# Patient Record
Sex: FEMALE | Race: WHITE | Employment: FULL TIME | URBAN - METROPOLITAN AREA
[De-identification: names, ages, dates, MRNs, and addresses within clinical notes are randomized per-mention and may not be internally consistent; named-entity substitution may affect disease eponyms.]

---

## 2017-07-10 PROBLEM — G43.909 MIGRAINE WITHOUT STATUS MIGRAINOSUS, NOT INTRACTABLE: Status: ACTIVE | Noted: 2017-07-10

## 2018-06-12 PROBLEM — E66.01 SEVERE OBESITY (BMI 35.0-39.9): Status: ACTIVE | Noted: 2018-06-12

## 2018-12-06 ENCOUNTER — HOSPITAL ENCOUNTER (OUTPATIENT)
Dept: MAMMOGRAPHY | Age: 48
Discharge: HOME | End: 2018-12-06
Attending: FAMILY MEDICINE
Payer: COMMERCIAL

## 2018-12-06 DIAGNOSIS — Z12.31 SCREENING MAMMOGRAM, ENCOUNTER FOR: ICD-10-CM

## 2018-12-06 PROCEDURE — 77067 SCR MAMMO BI INCL CAD: CPT

## 2019-12-11 ENCOUNTER — HOSPITAL ENCOUNTER (OUTPATIENT)
Dept: DIABETES SERVICES | Age: 49
Discharge: HOME | End: 2019-12-11
Payer: COMMERCIAL

## 2019-12-11 PROCEDURE — G0108 DIAB MANAGE TRN  PER INDIV: HCPCS

## 2019-12-11 RX ORDER — TIZANIDINE HYDROCHLORIDE 4 MG/1
4 CAPSULE, GELATIN COATED ORAL 3 TIMES DAILY
COMMUNITY
End: 2021-04-12

## 2019-12-11 RX ORDER — SUMATRIPTAN 50 MG/1
50 TABLET, FILM COATED ORAL
COMMUNITY

## 2019-12-11 RX ORDER — GABAPENTIN 100 MG/1
100 CAPSULE ORAL
COMMUNITY

## 2020-01-09 ENCOUNTER — HOSPITAL ENCOUNTER (OUTPATIENT)
Dept: DIABETES SERVICES | Age: 50
Discharge: HOME | End: 2020-01-09
Payer: COMMERCIAL

## 2020-01-09 PROCEDURE — G0109 DIAB MANAGE TRN IND/GROUP: HCPCS

## 2020-01-09 NOTE — PROGRESS NOTES
Attended nutrition diabetes #1 group session today. Topics included: disease process and treatment; carbohydrate choices (emphasizing high fiber carbohydrates); proteins (emphasizing heart healthy choices) and fat food choices (emphasizing unsaturated fats); free foods; combination food choices; nutrition tips for persons with diabetes; snack ideas; resources for diabetes management. Two methods of meal planning were reviewed: carbohydrate choices and carbohydrate counting. Basics of exercise discussed. Voiced /demonstrated understanding of material covered. Anticipated adherence is good. Problems/barriers may be: none anticipated  Pt goals:  -lose weight   -stay off diabetes medications   Reviewed using small plates, cups and bowls to help with portion control and weight loss and role of exercise to help with weight loss and lowering blood sugars. No medication changes since last visit. No new procedure or surgery since last visit.

## 2020-01-16 ENCOUNTER — HOSPITAL ENCOUNTER (OUTPATIENT)
Dept: DIABETES SERVICES | Age: 50
Discharge: HOME | End: 2020-01-16
Payer: COMMERCIAL

## 2020-01-16 PROCEDURE — G0109 DIAB MANAGE TRN IND/GROUP: HCPCS

## 2020-01-16 NOTE — PROGRESS NOTES
Attended nutrition diabetes #2 group session today. Topics included: plate method for portion control; fiber and sodium guidelines; sugar substitutes; alcohol; eating out; recipe modification; label reading. Participant's nutrition goal: To lose weight she will replace Dr. Hirsch Husbands soda with water or Hiral zero calorie drink by 2/1/20. Participant's nutrition support plan: Use the meal planning guide, fooducate andrae, [place] andrae, [employer] website for recipes and call to sign up for the free program from the [employer] called Living with Type 2 diabetes. Nutrition barriers identified by participant: none  Voiced/demonstrated understanding of material covered. Anticipated adherence is good. No medication changes, procedures or surgeries since last visit. Plan for follow up is: will attend diabetes class.

## 2020-01-17 ENCOUNTER — HOSPITAL ENCOUNTER (OUTPATIENT)
Dept: DIABETES SERVICES | Age: 50
Discharge: HOME | End: 2020-01-17
Payer: COMMERCIAL

## 2020-01-17 PROCEDURE — G0109 DIAB MANAGE TRN IND/GROUP: HCPCS

## 2020-01-17 NOTE — PROGRESS NOTES
Participant attended Diabetes #1 session today. Topics included: Characteristics/pathophysiology type 1/type 2 diabetes; Goal/acceptable blood glucose ranges/Hgb A1C/interpreting/using results;meters, continuous glucose monitors and insulin pumps. Using medications safely; Sick day management; Prevention/detection/treatment of acute complications. - Verbalized understanding of material covered.  -Anticipated adherence is good   -Problems/barriers may be  none anticipated   - Limited mobility due to back issues. Would like to control diabetes without use of medication.

## 2020-01-22 ENCOUNTER — HOSPITAL ENCOUNTER (OUTPATIENT)
Dept: DIABETES SERVICES | Age: 50
Discharge: HOME | End: 2020-01-22
Payer: COMMERCIAL

## 2020-01-22 PROCEDURE — G0109 DIAB MANAGE TRN IND/GROUP: HCPCS

## 2020-01-22 NOTE — PROGRESS NOTES
Participant attended Diabetes #2 session today. Topics included: Prevention/detection/treatment of chronic complications; sleep apnea; Developing strategies to promote health/change behavior/recommended screenings; Developing strategies to address psychosocial issues; Goal setting. Participants goal/support plan includes:        Diabetes Goal:To stay off of medication, I will test blood sugars once a day. I will talk to physician about meter purchase at my appointment in February. I will begin testing after meter obtained. I will evaluate after two weeks and increase testing to twice a day and report any issues to physician. Diabetes Support  Plan: Follow Diabetes Education Material         Problems/barriers may be: Not having a glucose meter. Plan for follow up/Recommendations: mail follow up survey at 6 months and one year. Medication Reconciliation: No changes in medications, surgery or procedures. External referral . Will notify physician via fax of completion of Diabetes Education. Requested updated labs and or medications.

## 2021-04-12 ENCOUNTER — HOSPITAL ENCOUNTER (EMERGENCY)
Age: 51
Discharge: HOME | End: 2021-04-12
Attending: EMERGENCY MEDICINE
Payer: COMMERCIAL

## 2021-04-12 ENCOUNTER — APPOINTMENT (OUTPATIENT)
Dept: GENERAL RADIOLOGY | Age: 51
End: 2021-04-12
Attending: EMERGENCY MEDICINE
Payer: COMMERCIAL

## 2021-04-12 VITALS
HEART RATE: 86 BPM | OXYGEN SATURATION: 99 % | TEMPERATURE: 98 F | DIASTOLIC BLOOD PRESSURE: 68 MMHG | BODY MASS INDEX: 38.21 KG/M2 | HEIGHT: 69 IN | WEIGHT: 258 LBS | RESPIRATION RATE: 16 BRPM | SYSTOLIC BLOOD PRESSURE: 138 MMHG

## 2021-04-12 DIAGNOSIS — S80.01XA CONTUSION OF RIGHT KNEE, INITIAL ENCOUNTER: ICD-10-CM

## 2021-04-12 DIAGNOSIS — S80.02XA CONTUSION OF LEFT KNEE, INITIAL ENCOUNTER: ICD-10-CM

## 2021-04-12 DIAGNOSIS — S00.83XA CONTUSION OF FACE, INITIAL ENCOUNTER: Primary | ICD-10-CM

## 2021-04-12 PROCEDURE — 73562 X-RAY EXAM OF KNEE 3: CPT

## 2021-04-12 PROCEDURE — 99283 EMERGENCY DEPT VISIT LOW MDM: CPT

## 2021-04-12 PROCEDURE — 70150 X-RAY EXAM OF FACIAL BONES: CPT

## 2021-04-12 RX ORDER — METHOCARBAMOL 750 MG/1
750 TABLET, FILM COATED ORAL 4 TIMES DAILY
Qty: 30 TAB | Refills: 0 | Status: SHIPPED | OUTPATIENT
Start: 2021-04-12 | End: 2021-04-20

## 2021-04-12 RX ORDER — NAPROXEN 500 MG/1
500 TABLET ORAL 2 TIMES DAILY WITH MEALS
Qty: 20 TAB | Refills: 0 | Status: SHIPPED | OUTPATIENT
Start: 2021-04-12 | End: 2021-04-22

## 2021-04-13 NOTE — DISCHARGE INSTRUCTIONS
Ice to all sore areas, use meds as directed, see your primary care physician for routine recheck
“You can access the FollowHealth Patient Portal, offered by [facility], by registering with the following website: [email or web address]”

## 2021-04-13 NOTE — ED PROVIDER NOTES
Patient states she tripped over a short pet gait in her home landing on her knees and hitting her face, no loss of consciousness no lacerations no meds taken prior to arrival.    The history is provided by the patient. Fall  The accident occurred 1 to 2 hours ago. The fall occurred while walking. She fell from a height of ground level. She landed on hard floor. There was no blood loss. The point of impact was the right knee and left knee (Face). Pain location: As above. The pain is at a severity of 9/10. The pain is moderate. She was ambulatory at the scene. There was no entrapment after the fall. There was no drug use involved in the accident. There was no alcohol use involved in the accident. Pertinent negatives include no loss of consciousness and no laceration. The symptoms are aggravated by pressure on injury. She has tried nothing for the symptoms. The treatment provided no relief.         Past Medical History:   Diagnosis Date    Anxiety     Asthma     Chronic pain     neck/back MRI 2015 cervical spinal stenosis    Chronic sinusitis     GERD (gastroesophageal reflux disease)     prilosec prn    Hyperlipidemia     Infertility     Multinodular goiter 11/20/2015    Sleep apnea     no cpap    Type 2 diabetes mellitus (HCC)        Past Surgical History:   Procedure Laterality Date    HX ANKLE FRACTURE TX Right     w/plate    HX CHOLECYSTECTOMY      HX ORTHOPAEDIC      left thumb bone with defect and repaired    HX TONSILLECTOMY      and sinus surgery         Family History:   Problem Relation Age of Onset    Diabetes Mother     Thyroid Disease Mother         Status post hemithyroidectomy (benign), hypothyroidism    Breast Cancer Mother 36        3 times    Diabetes Father     Diabetes Maternal Aunt     Cancer Maternal Aunt     Breast Cancer Maternal Aunt 61    Heart Disease Paternal Aunt     Heart Disease Paternal Uncle     Diabetes Maternal Grandmother     Heart Disease Maternal Grandmother     Cancer Maternal Grandfather     Breast Cancer Maternal Grandfather     Heart Disease Paternal Grandfather     Cancer Maternal Aunt     Breast Cancer Maternal Aunt 61    Cancer Maternal Aunt     Heart Disease Paternal Uncle     Thyroid Disease Sister         Hypothyroidism    Thyroid Disease Cousin     Thyroid Cancer Neg Hx        Social History     Socioeconomic History    Marital status:      Spouse name: Not on file    Number of children: Not on file    Years of education: Not on file    Highest education level: Not on file   Occupational History    Not on file   Social Needs    Financial resource strain: Not on file    Food insecurity     Worry: Not on file     Inability: Not on file   [employer] needs     Medical: Not on file     Non-medical: Not on file   Tobacco Use    Smoking status: Former Smoker     Packs/day: 0.25     Years: 1.00     Pack years: 0.25    Smokeless tobacco: Never Used   Substance and Sexual Activity    Alcohol use: No    Drug use: Yes     Types: Prescription    Sexual activity: Not on file   Lifestyle    Physical activity     Days per week: Not on file     Minutes per session: Not on file    Stress: Not on file   Relationships    Social connections     Talks on phone: Not on file     Gets together: Not on file     Attends Muslim service: Not on file     Active member of club or organization: Not on file     Attends meetings of clubs or organizations: Not on file     Relationship status: Not on file    Intimate partner violence     Fear of current or ex partner: Not on file     Emotionally abused: Not on file     Physically abused: Not on file     Forced sexual activity: Not on file   Other Topics Concern     Service Not Asked    Blood Transfusions Not Asked    Caffeine Concern Not Asked    Occupational Exposure Not Asked   Sandrita Age Hazards Not Asked    Sleep Concern Not Asked    Stress Concern Not Asked    Weight Concern Not Asked    Special Diet No    Back Care Not Asked    Exercise No    Bike Helmet Not Asked    Seat Belt Not Asked    Self-Exams Not Asked   Social History Narrative    Not on file         ALLERGIES: Fd and c blue no.1, Iodine and iodide containing products, Levaquin [levofloxacin], Pcn [penicillins], Reglan [metoclopramide], and Sulfa (sulfonamide antibiotics)    Review of Systems   Neurological: Negative for loss of consciousness. All other systems reviewed and are negative. Vitals:    04/12/21 1815   BP: (!) 156/93   Pulse: (!) 110   Resp: 18   Temp: 99 °F (37.2 °C)   SpO2: 97%   Weight: 117 kg (258 lb)   Height: 5' 9\" (1.753 m)            Physical Exam  Vitals signs and nursing note reviewed. Constitutional:       General: She is not in acute distress. Appearance: Normal appearance. She is well-developed. She is obese. She is not diaphoretic. HENT:      Head: Normocephalic. Comments: Tenderness to palpation right cheek area no swelling noted slight red abrasion from her glasses but no defect. No bleeding from the nose oropharynx is clear patient has some slight swelling to the right upper lip area  Eyes:      Pupils: Pupils are equal, round, and reactive to light. Neck:      Musculoskeletal: Normal range of motion and neck supple. Comments: Full painless cervical motion noted  Cardiovascular:      Rate and Rhythm: Normal rate and regular rhythm. Pulmonary:      Effort: Pulmonary effort is normal.      Breath sounds: Normal breath sounds. Abdominal:      General: Bowel sounds are normal.      Palpations: Abdomen is soft. Musculoskeletal: Normal range of motion. General: Tenderness present. Comments: Mild soreness to left upper chest area, no point tenderness noted full range of motion of the left shoulder.,  Bilateral knees with anterior tenderness full range of motion no crepitus no abrasions, ligaments are stable   Skin:     General: Skin is warm. Findings: No laceration. Neurological:      General: No focal deficit present. Mental Status: She is alert and oriented to person, place, and time. Psychiatric:         Mood and Affect: Mood normal.         Behavior: Behavior normal.          MDM  Number of Diagnoses or Management Options  Diagnosis management comments: Facial bone x-rays and bilateral knee x-ray showed no acute process.   Patient to use ice to all sore areas given prescription for Naprosyn and Robaxin, see primary care for routine recheck       Amount and/or Complexity of Data Reviewed  Tests in the radiology section of CPT®: ordered and reviewed  Review and summarize past medical records: yes    Risk of Complications, Morbidity, and/or Mortality  Presenting problems: low  Diagnostic procedures: low  Management options: low    Patient Progress  Patient progress: improved         Procedures

## 2021-04-13 NOTE — ED NOTES
I have reviewed discharge instructions with the patient. The patient verbalized understanding. Patient left ED via Discharge Method: ambulatory to Home with (insert name of family/friend, self, transport family). Opportunity for questions and clarification provided. Patient given 2 scripts. To continue your aftercare when you leave the hospital, you may receive an automated call from our care team to check in on how you are doing.  This is a free service and part of our promise to provide the best care and service to meet your aftercare needs. \" If you have questions, or wish to unsubscribe from this service please call [phone].  Thank you for Choosing our [facility] Emergency Department.

## 2022-03-20 PROBLEM — G43.909 MIGRAINE WITHOUT STATUS MIGRAINOSUS, NOT INTRACTABLE: Status: ACTIVE | Noted: 2017-07-10

## 2022-09-03 ENCOUNTER — HOSPITAL ENCOUNTER (EMERGENCY)
Age: 52
Discharge: HOME | End: 2022-09-03
Attending: EMERGENCY MEDICINE
Payer: COMMERCIAL

## 2022-09-03 ENCOUNTER — HOSPITAL ENCOUNTER (EMERGENCY)
Dept: CT IMAGING | Age: 52
Discharge: HOME | End: 2022-09-06
Payer: COMMERCIAL

## 2022-09-03 VITALS
RESPIRATION RATE: 20 BRPM | OXYGEN SATURATION: 91 % | WEIGHT: 255 LBS | TEMPERATURE: 98.9 F | HEART RATE: 101 BPM | SYSTOLIC BLOOD PRESSURE: 121 MMHG | HEIGHT: 69 IN | BODY MASS INDEX: 37.77 KG/M2 | DIASTOLIC BLOOD PRESSURE: 72 MMHG

## 2022-09-03 DIAGNOSIS — K59.03 DRUG-INDUCED CONSTIPATION: ICD-10-CM

## 2022-09-03 DIAGNOSIS — N30.00 ACUTE CYSTITIS WITHOUT HEMATURIA: ICD-10-CM

## 2022-09-03 DIAGNOSIS — R10.30 LOWER ABDOMINAL PAIN: Primary | ICD-10-CM

## 2022-09-03 LAB
ALBUMIN SERPL-MCNC: 3.3 G/DL (ref 3.5–5)
ALBUMIN/GLOB SERPL: 0.6 {RATIO} (ref 1.2–3.5)
ALP SERPL-CCNC: 112 U/L (ref 50–136)
ALT SERPL-CCNC: 42 U/L (ref 12–65)
ANION GAP SERPL CALC-SCNC: 11 MMOL/L (ref 4–13)
APPEARANCE UR: ABNORMAL
AST SERPL-CCNC: 68 U/L (ref 15–37)
BACTERIA URNS QL MICRO: ABNORMAL /HPF
BASOPHILS # BLD: 0 K/UL (ref 0–0.2)
BASOPHILS NFR BLD: 0 % (ref 0–2)
BILIRUB SERPL-MCNC: 0.6 MG/DL (ref 0.2–1.1)
BILIRUB UR QL: ABNORMAL
BUN SERPL-MCNC: 18 MG/DL (ref 6–23)
CALCIUM SERPL-MCNC: 8.6 MG/DL (ref 8.3–10.4)
CHLORIDE SERPL-SCNC: 102 MMOL/L (ref 101–110)
CO2 SERPL-SCNC: 19 MMOL/L (ref 21–32)
COLOR UR: ABNORMAL
CREAT SERPL-MCNC: 0.98 MG/DL (ref 0.6–1)
DIFFERENTIAL METHOD BLD: ABNORMAL
EOSINOPHIL # BLD: 0 K/UL (ref 0–0.8)
EOSINOPHIL NFR BLD: 0 % (ref 0.5–7.8)
EPI CELLS #/AREA URNS HPF: ABNORMAL /HPF
ERYTHROCYTE [DISTWIDTH] IN BLOOD BY AUTOMATED COUNT: 13.1 % (ref 11.9–14.6)
GLOBULIN SER CALC-MCNC: 5.1 G/DL (ref 2.3–3.5)
GLUCOSE SERPL-MCNC: 185 MG/DL (ref 65–100)
GLUCOSE UR STRIP.AUTO-MCNC: NEGATIVE MG/DL
HCG SERPL QL: NEGATIVE
HCT VFR BLD AUTO: 47.8 % (ref 35.8–46.3)
HGB BLD-MCNC: 16.1 G/DL (ref 11.7–15.4)
HGB UR QL STRIP: NEGATIVE
IMM GRANULOCYTES # BLD AUTO: 0.1 K/UL (ref 0–0.5)
IMM GRANULOCYTES NFR BLD AUTO: 1 % (ref 0–5)
KETONES UR QL STRIP.AUTO: ABNORMAL MG/DL
LACTATE SERPL-SCNC: 1.8 MMOL/L (ref 0.4–2)
LEUKOCYTE ESTERASE UR QL STRIP.AUTO: ABNORMAL
LIPASE SERPL-CCNC: 24 U/L (ref 73–393)
LIPASE SERPL-CCNC: <10 U/L (ref 73–393)
LYMPHOCYTES # BLD: 1.5 K/UL (ref 0.5–4.6)
LYMPHOCYTES NFR BLD: 12 % (ref 13–44)
MCH RBC QN AUTO: 29 PG (ref 26.1–32.9)
MCHC RBC AUTO-ENTMCNC: 33.7 G/DL (ref 31.4–35)
MCV RBC AUTO: 86 FL (ref 79.6–97.8)
MONOCYTES # BLD: 1.1 K/UL (ref 0.1–1.3)
MONOCYTES NFR BLD: 9 % (ref 4–12)
NEUTS SEG # BLD: 9.6 K/UL (ref 1.7–8.2)
NEUTS SEG NFR BLD: 79 % (ref 43–78)
NITRITE UR QL STRIP.AUTO: NEGATIVE
NRBC # BLD: 0 K/UL (ref 0–0.2)
PH UR STRIP: 6 [PH] (ref 5–9)
PLATELET # BLD AUTO: 417 K/UL (ref 150–450)
PMV BLD AUTO: 9.8 FL (ref 9.4–12.3)
POTASSIUM SERPL-SCNC: 4.7 MMOL/L (ref 3.5–5.1)
PROT SERPL-MCNC: 8.4 G/DL (ref 6.3–8.2)
PROT UR STRIP-MCNC: 100 MG/DL
RBC # BLD AUTO: 5.56 M/UL (ref 4.05–5.2)
RBC #/AREA URNS HPF: ABNORMAL /HPF
SODIUM SERPL-SCNC: 132 MMOL/L (ref 136–145)
SP GR UR REFRACTOMETRY: >1.035 (ref 1–1.02)
TROPONIN I SERPL HS-MCNC: 8.8 PG/ML (ref 0–14)
UROBILINOGEN UR QL STRIP.AUTO: 1 EU/DL (ref 0.2–1)
WBC # BLD AUTO: 12.2 K/UL (ref 4.3–11.1)
WBC URNS QL MICRO: ABNORMAL /HPF

## 2022-09-03 PROCEDURE — 93005 ELECTROCARDIOGRAM TRACING: CPT | Performed by: EMERGENCY MEDICINE

## 2022-09-03 PROCEDURE — 96366 THER/PROPH/DIAG IV INF ADDON: CPT

## 2022-09-03 PROCEDURE — 81001 URINALYSIS AUTO W/SCOPE: CPT

## 2022-09-03 PROCEDURE — 83690 ASSAY OF LIPASE: CPT

## 2022-09-03 PROCEDURE — 96375 TX/PRO/DX INJ NEW DRUG ADDON: CPT

## 2022-09-03 PROCEDURE — 96361 HYDRATE IV INFUSION ADD-ON: CPT

## 2022-09-03 PROCEDURE — 6370000000 HC RX 637 (ALT 250 FOR IP): Performed by: EMERGENCY MEDICINE

## 2022-09-03 PROCEDURE — 84484 ASSAY OF TROPONIN QUANT: CPT

## 2022-09-03 PROCEDURE — 85025 COMPLETE CBC W/AUTO DIFF WBC: CPT

## 2022-09-03 PROCEDURE — 6360000002 HC RX W HCPCS: Performed by: EMERGENCY MEDICINE

## 2022-09-03 PROCEDURE — 74177 CT ABD & PELVIS W/CONTRAST: CPT

## 2022-09-03 PROCEDURE — 2580000003 HC RX 258: Performed by: EMERGENCY MEDICINE

## 2022-09-03 PROCEDURE — 83605 ASSAY OF LACTIC ACID: CPT

## 2022-09-03 PROCEDURE — 84703 CHORIONIC GONADOTROPIN ASSAY: CPT

## 2022-09-03 PROCEDURE — 96365 THER/PROPH/DIAG IV INF INIT: CPT

## 2022-09-03 PROCEDURE — 99285 EMERGENCY DEPT VISIT HI MDM: CPT

## 2022-09-03 PROCEDURE — 6360000004 HC RX CONTRAST MEDICATION: Performed by: EMERGENCY MEDICINE

## 2022-09-03 PROCEDURE — 80053 COMPREHEN METABOLIC PANEL: CPT

## 2022-09-03 RX ORDER — CEFDINIR 300 MG/1
300 CAPSULE ORAL 2 TIMES DAILY
Qty: 14 CAPSULE | Refills: 0 | Status: SHIPPED | OUTPATIENT
Start: 2022-09-03 | End: 2022-09-10

## 2022-09-03 RX ORDER — BUPRENORPHINE HYDROCHLORIDE 75 UG/1
75 FILM, SOLUBLE BUCCAL DAILY
COMMUNITY

## 2022-09-03 RX ORDER — 0.9 % SODIUM CHLORIDE 0.9 %
100 INTRAVENOUS SOLUTION INTRAVENOUS
Status: COMPLETED | OUTPATIENT
Start: 2022-09-03 | End: 2022-09-03

## 2022-09-03 RX ORDER — ACETAMINOPHEN 500 MG
1000 TABLET ORAL
Status: COMPLETED | OUTPATIENT
Start: 2022-09-03 | End: 2022-09-03

## 2022-09-03 RX ORDER — ONDANSETRON 2 MG/ML
4 INJECTION INTRAMUSCULAR; INTRAVENOUS ONCE
Status: COMPLETED | OUTPATIENT
Start: 2022-09-03 | End: 2022-09-03

## 2022-09-03 RX ORDER — SODIUM CHLORIDE 0.9 % (FLUSH) 0.9 %
10 SYRINGE (ML) INJECTION
Status: COMPLETED | OUTPATIENT
Start: 2022-09-03 | End: 2022-09-03

## 2022-09-03 RX ORDER — SODIUM CHLORIDE, SODIUM LACTATE, POTASSIUM CHLORIDE, AND CALCIUM CHLORIDE .6; .31; .03; .02 G/100ML; G/100ML; G/100ML; G/100ML
1000 INJECTION, SOLUTION INTRAVENOUS ONCE
Status: COMPLETED | OUTPATIENT
Start: 2022-09-03 | End: 2022-09-03

## 2022-09-03 RX ORDER — ELAGOLIX 200 MG/1
TABLET, FILM COATED ORAL
COMMUNITY

## 2022-09-03 RX ORDER — MORPHINE SULFATE 4 MG/ML
4 INJECTION INTRAVENOUS
Status: COMPLETED | OUTPATIENT
Start: 2022-09-03 | End: 2022-09-03

## 2022-09-03 RX ADMIN — MORPHINE SULFATE 4 MG: 4 INJECTION, SOLUTION INTRAMUSCULAR; INTRAVENOUS at 16:29

## 2022-09-03 RX ADMIN — SODIUM CHLORIDE, PRESERVATIVE FREE 10 ML: 5 INJECTION INTRAVENOUS at 16:52

## 2022-09-03 RX ADMIN — IOHEXOL 100 ML: 350 INJECTION, SOLUTION INTRAVENOUS at 16:52

## 2022-09-03 RX ADMIN — ONDANSETRON 4 MG: 2 INJECTION INTRAMUSCULAR; INTRAVENOUS at 16:27

## 2022-09-03 RX ADMIN — SODIUM CHLORIDE, POTASSIUM CHLORIDE, SODIUM LACTATE AND CALCIUM CHLORIDE 1000 ML: 600; 310; 30; 20 INJECTION, SOLUTION INTRAVENOUS at 16:26

## 2022-09-03 RX ADMIN — VANCOMYCIN HYDROCHLORIDE 1750 MG: 10 INJECTION, POWDER, LYOPHILIZED, FOR SOLUTION INTRAVENOUS at 17:38

## 2022-09-03 RX ADMIN — ACETAMINOPHEN 1000 MG: 500 TABLET, FILM COATED ORAL at 16:27

## 2022-09-03 RX ADMIN — SODIUM CHLORIDE 100 ML: 9 INJECTION, SOLUTION INTRAVENOUS at 16:52

## 2022-09-03 ASSESSMENT — ENCOUNTER SYMPTOMS
VOICE CHANGE: 0
VOMITING: 1
SORE THROAT: 0
EYE PAIN: 0
BACK PAIN: 0
COUGH: 0
FACIAL SWELLING: 0
CHEST TIGHTNESS: 0
TROUBLE SWALLOWING: 0
ABDOMINAL PAIN: 1
NAUSEA: 1
SHORTNESS OF BREATH: 0

## 2022-09-03 ASSESSMENT — PAIN SCALES - GENERAL
PAINLEVEL_OUTOF10: 10
PAINLEVEL_OUTOF10: 9
PAINLEVEL_OUTOF10: 3

## 2022-09-03 ASSESSMENT — PAIN DESCRIPTION - FREQUENCY: FREQUENCY: INTERMITTENT

## 2022-09-03 ASSESSMENT — PAIN DESCRIPTION - ORIENTATION
ORIENTATION: UPPER
ORIENTATION: UPPER

## 2022-09-03 ASSESSMENT — PAIN DESCRIPTION - LOCATION
LOCATION: ABDOMEN

## 2022-09-03 ASSESSMENT — PAIN - FUNCTIONAL ASSESSMENT: PAIN_FUNCTIONAL_ASSESSMENT: 0-10

## 2022-09-03 ASSESSMENT — PAIN DESCRIPTION - DESCRIPTORS: DESCRIPTORS: ACHING

## 2022-09-03 ASSESSMENT — PAIN DESCRIPTION - PAIN TYPE: TYPE: ACUTE PAIN

## 2022-09-03 NOTE — ED TRIAGE NOTES
Patient reports diffuse abd pain , nausea , and vomiting x 2-3 days. Patient reports some cough, fever and chills.  Masked

## 2022-09-03 NOTE — DISCHARGE INSTRUCTIONS
You are diagnosed with unspecified abdominal pain here. Your CT scan of your abdomen showed evidence of constipation. I think you should be on a daily MiraLAX with your history of being on opioid pain medication. Your urinalysis showed evidence of infection here we have written you for an antibiotic for home. I recommend you do a bowel cleanout with MiraLAX to resolve your constipation. Please return the emergency department for any worsening symptoms.

## 2022-09-03 NOTE — ED PROVIDER NOTES
[facility] Emergency Department Provider Note                   PCP:                Erwin Eduardo MD               Age: 54 y.o. Sex: female       ICD-10-CM    1. Lower abdominal pain  R10.30       2. Drug-induced constipation  K59.03       3. Acute cystitis without hematuria  N30.00           DISPOSITION Decision To Discharge 09/03/2022 07:22:03 PM        MDM  Number of Diagnoses or Management Options  Acute cystitis without hematuria  Drug-induced constipation  Lower abdominal pain  Diagnosis management comments: 37-year-old female presents emerged department via private vehicle with chief complaint of nausea vomiting and abdominal pain for 3 days. Vital signs here are reviewed. Patient is tachycardic in triage. Patient is ill-appearing with generalized abdominal tenderness. Basic lab work here was obtained. CT scan of the abdomen and pelvis obtained to rule out any evidence of intra-abdominal pathology. Patient was given fluids as well as pain and nausea medication. EKG showed sinus tachycardia with no evidence of acute ischemia. CBC shows 12,000 white count, CMP here is fairly unremarkable, urinalysis shows small leuks with 1+ bacteria. Lactic acid is within normal limits  CT of abdomen shows no evidence of acute process there is dense stool throughout which is concerning for constipation. This point the patient will be written for an antibiotic for home she was also given a MiraLAX cleanout for home. Patient states that she is on at home Percocet I think this is the culprit of her constipation. Patient was given return precautions. Patient stable discharge examination.            Orders Placed This Encounter   Procedures    CT ABDOMEN PELVIS W IV CONTRAST Additional Contrast? None    CBC with Diff    CMP    Lipase    Troponin    Lipase    Lactic Acid    HCG Qualitative, Serum    Urinalysis w rflx microscopic    Diet NPO    EKG 12 Lead (Select if Upper Abd Pain, or SOB, Diaphoresis or Tachy) Saline lock IV        Medications   vancomycin (VANCOCIN) 1750 mg in sodium chloride 0.9 % 500 mL IVPB (1,750 mg IntraVENous New Bag 9/3/22 1738)   lactated ringers bolus (1,000 mLs IntraVENous New Bag 9/3/22 1626)   acetaminophen (TYLENOL) tablet 1,000 mg (1,000 mg Oral Given 9/3/22 1627)   morphine injection 4 mg (4 mg IntraVENous Given 9/3/22 1629)   ondansetron (ZOFRAN) injection 4 mg (4 mg IntraVENous Given 9/3/22 1627)       New Prescriptions    CEFDINIR (OMNICEF) 300 MG CAPSULE    Take 1 capsule by mouth 2 times daily for 7 days        April Bob Casey is a 54 y.o. female who presents to the Emergency Department with chief complaint of    Chief Complaint   Patient presents with    Abdominal Pain      74-year-old female presents emerged department via private vehicle with chief complaint nausea vomiting and abdominal pain. She reports 3-day history of the symptoms with worsening in time. She denies any alleviating or aggravating factors. She reports have a history of asthma, endometriosis, hypertension hyperlipidemia and type 2 diabetes. She denies having any current urinary symptoms, dysuria vaginal bleeding or vaginal discharge. She has had her gallbladder removed in the past as well as a lap diagnostic for endometriosis. She still has her appendix and uterus. Review of Systems   Constitutional:  Negative for activity change, chills and fever. HENT:  Negative for dental problem, drooling, facial swelling, sore throat, trouble swallowing and voice change. Eyes:  Negative for pain. Respiratory:  Negative for cough, chest tightness and shortness of breath. Cardiovascular:  Negative for chest pain and palpitations. Gastrointestinal:  Positive for abdominal pain, nausea and vomiting. Endocrine: Negative for polydipsia. Genitourinary:  Negative for difficulty urinating, dysuria and hematuria. Musculoskeletal:  Negative for back pain and neck pain.    Skin:  Negative for rash and wound.   Neurological:  Negative for dizziness, seizures, facial asymmetry, speech difficulty, numbness and headaches. Psychiatric/Behavioral:  Negative for agitation and behavioral problems.       Past Medical History:   Diagnosis Date    Anxiety     Asthma     Chronic pain     neck/back MRI 2015 cervical spinal stenosis    Chronic sinusitis     GERD (gastroesophageal reflux disease)     prilosec prn    Hyperlipidemia     Multinodular goiter 11/20/2015    Sleep apnea     no cpap    Type 2 diabetes mellitus ([address]         Past Surgical History:   Procedure Laterality Date    ANKLE FRACTURE SURGERY Right     w/plate    CHOLECYSTECTOMY      ORTHOPEDIC SURGERY      left thumb bone with defect and repaired    TONSILLECTOMY      and sinus surgery        Family History   Problem Relation Age of Onset    Thyroid Cancer Neg Hx     Diabetes Mother     Thyroid Disease Mother         Status post hemithyroidectomy (benign), hypothyroidism    Breast Cancer Mother 36        3 times    Diabetes Father     Thyroid Disease Cousin     Thyroid Disease Sister         Hypothyroidism    Heart Disease Paternal Uncle     Cancer Maternal Aunt     Breast Cancer Maternal Aunt 61    Cancer Maternal Aunt     Heart Disease Paternal Grandfather     Breast Cancer Maternal Grandfather     Cancer Maternal Grandfather     Heart Disease Maternal Grandmother     Diabetes Maternal Grandmother     Heart Disease Paternal Uncle     Heart Disease Paternal Aunt     Breast Cancer Maternal Aunt 61    Cancer Maternal Aunt     Diabetes Maternal Aunt         Social History     Socioeconomic History    Marital status:      Spouse name: None    Number of children: None    Years of education: None    Highest education level: None   Tobacco Use    Smoking status: Former     Packs/day: 0.25     Types: Cigarettes    Smokeless tobacco: Never   Vaping Use    Vaping Use: Never used   Substance and Sexual Activity    Alcohol use: No    Drug use: Yes     Types: Prescription         Fd&c blue #1 (brilliant blue fcf), Penicillins, Levofloxacin, Metoclopramide, and Sulfa antibiotics     Previous Medications    ALBUTEROL SULFATE  (90 BASE) MCG/ACT INHALER    2 puffs q 4 hrs prn    AMITRIPTYLINE (ELAVIL) 25 MG TABLET    1 po qhs    ATORVASTATIN (LIPITOR) 10 MG TABLET    TAKE 1 TABLET BY MOUTH EVERY DAY    BUPRENORPHINE (BELBUCA) 75 MCG FILM BUCCAL FILM    Place 75 mcg inside cheek daily. BUSPIRONE (BUSPAR) 15 MG TABLET    Take 15 mg by mouth 3 times daily    CYCLOBENZAPRINE (FLEXERIL) 10 MG TABLET    Take by mouth 3 times daily as needed    ELAGOLIX SODIUM (ORILISSA) 200 MG TABS    Take by mouth    FLUTICASONE FUROATE-VILANTEROL (BREO ELLIPTA) 200-25 MCG/INH AEPB INHALER    Inhale 1 puff into the lungs daily    GABAPENTIN (NEURONTIN) 100 MG CAPSULE    Take 100 mg by mouth. HYDROCHLOROTHIAZIDE (HYDRODIURIL) 25 MG TABLET    Take 25 mg by mouth    HYDROXYZINE (VISTARIL) 25 MG CAPSULE    TAKE 1 CAPSULE (25 MG TOTAL) BY MOUTH 3 (THREE) TIMES A DAY AS NEEDED FOR ITCHING    LIDOCAINE (LIDODERM) 5 %    Place 1 patch onto the skin every 12 hours    METFORMIN (GLUCOPHAGE) 500 MG TABLET    TAKE 1 TABLET BY MOUTH TWICE A DAY    MONTELUKAST (SINGULAIR) 10 MG TABLET    1 po qhs    NORETHINDRONE-ETHINYL ESTRADIOL (ORTHO-NOVUM 7/7/7) 0.5/0.75/1-35 MG-MCG PER TABLET    TAKE 1 TABLET BY MOUTH DAILY. CONTINUOUS ACTIVE PILLS ONLY    OXYCODONE (OXYCONTIN) 10 MG EXTENDED RELEASE TABLET    Take 5 mg by mouth in the morning and 5 mg in the evening. PROMETHAZINE (PHENERGAN) 25 MG TABLET    Take 25 mg by mouth    SERTRALINE (ZOLOFT) 100 MG TABLET    1 po qd    SUMATRIPTAN (IMITREX) 50 MG TABLET    Take 50 mg by mouth once as needed        Vitals signs and nursing note reviewed.    Patient Vitals for the past 4 hrs:   Pulse Resp BP SpO2   09/03/22 1920 100 22 (!) 120/55 90 %   09/03/22 1900 (!) 103 13 (!) 119/59 90 %   09/03/22 1840 (!) 106 22 131/72 90 %   09/03/22 1820 (!) 107 17 126/60 91 %   09/03/22 1800 (!) 113 24 136/64 90 %   09/03/22 1737 (!) 115 15 133/66 93 %   09/03/22 1722 (!) 120 12 (!) 147/64 92 %   09/03/22 1637 (!) 118 18 (!) 143/66 92 %   09/03/22 1627 (!) 125 18 (!) 150/71 92 %          Physical Exam  Vitals and nursing note reviewed. Constitutional:       General: She is not in acute distress. Appearance: Normal appearance. She is ill-appearing and diaphoretic. HENT:      Head: Normocephalic and atraumatic. Right Ear: Tympanic membrane normal.      Left Ear: Tympanic membrane normal.      Mouth/Throat:      Mouth: Mucous membranes are moist.      Pharynx: Oropharynx is clear. Eyes:      Extraocular Movements: Extraocular movements intact. Pupils: Pupils are equal, round, and reactive to light. Cardiovascular:      Rate and Rhythm: Regular rhythm. Tachycardia present. Heart sounds: No murmur heard. Pulmonary:      Effort: No respiratory distress. Breath sounds: No wheezing or rhonchi. Abdominal:      Palpations: Abdomen is soft. There is no mass. Tenderness: There is abdominal tenderness. There is no guarding. Comments: Tenderness to palpation in the left lower quadrant, right lower quadrant and right upper quadrant.,  Generalized tenderness none on signs of rebound or guarding. Musculoskeletal:         General: No swelling or tenderness. Cervical back: Normal range of motion and neck supple. No rigidity or tenderness. Skin:     General: Skin is warm. Capillary Refill: Capillary refill takes less than 2 seconds. Neurological:      General: No focal deficit present. Mental Status: She is alert and oriented to person, place, and time. Mental status is at baseline. Psychiatric:         Mood and Affect: Mood normal.         Behavior: Behavior normal.        Procedures      EKG interpreted without the presence of cardiologist.  Sinus tachycardia with ventricular rate of 130s.   No evidence of any ST segment ovation or depression are noted. No signs of acute ischemia. [unfilled]     CT ABDOMEN PELVIS W IV CONTRAST Additional Contrast? None   Final Result   1. No worrisome acute process addressed by CT. Dense stool is seen throughout   much of the colon which can be an indicator of constipation. This report was made using voice transcription. Despite my best efforts to avoid   any, transcription errors may persist. If there is any question about the   accuracy of the report or need for clarification, then please call [phone], or text me through [employer] for clarification or correction. Voice dictation software was used during the making of this note. This software is not perfect and grammatical and other typographical errors may be present. This note has not been completely proofread for errors.      Yobani Mcclure,   09/03/22 1924

## 2022-09-04 LAB
EKG ATRIAL RATE: 130 BPM
EKG DIAGNOSIS: NORMAL
EKG P AXIS: 35 DEGREES
EKG P-R INTERVAL: 114 MS
EKG Q-T INTERVAL: 296 MS
EKG QRS DURATION: 74 MS
EKG QTC CALCULATION (BAZETT): 435 MS
EKG R AXIS: 68 DEGREES
EKG T AXIS: 53 DEGREES
EKG VENTRICULAR RATE: 130 BPM

## 2022-09-04 NOTE — ED NOTES
I have reviewed discharge instructions with the patient. The patient verbalized understanding. Patient left ED via Discharge Method: ambulatory to Home with spouse. Opportunity for questions and clarification provided. Patient given 1 scripts. To continue your aftercare when you leave the hospital, you may receive an automated call from our care team to check in on how you are doing. This is a free service and part of our promise to provide the best care and service to meet your aftercare needs.  If you have questions, or wish to unsubscribe from this service please call [phone]. Thank you for Choosing our [address] [place] [address] Emergency Department.           Ahmet Hackett RN  09/03/22 2000